# Patient Record
Sex: MALE | Race: WHITE | Employment: FULL TIME | URBAN - METROPOLITAN AREA
[De-identification: names, ages, dates, MRNs, and addresses within clinical notes are randomized per-mention and may not be internally consistent; named-entity substitution may affect disease eponyms.]

---

## 2024-10-07 ENCOUNTER — HOSPITAL ENCOUNTER (EMERGENCY)
Age: 44
Discharge: HOME | End: 2024-10-07
Attending: EMERGENCY MEDICINE
Payer: COMMERCIAL

## 2024-10-07 ENCOUNTER — APPOINTMENT (OUTPATIENT)
Dept: GENERAL RADIOLOGY | Age: 44
End: 2024-10-07
Payer: COMMERCIAL

## 2024-10-07 VITALS
DIASTOLIC BLOOD PRESSURE: 96 MMHG | SYSTOLIC BLOOD PRESSURE: 134 MMHG | BODY MASS INDEX: 32.73 KG/M2 | TEMPERATURE: 98 F | HEART RATE: 87 BPM | HEIGHT: 74 IN | OXYGEN SATURATION: 98 % | RESPIRATION RATE: 16 BRPM | WEIGHT: 255 LBS

## 2024-10-07 DIAGNOSIS — S62.324A CLOSED DISPLACED FRACTURE OF SHAFT OF FOURTH METACARPAL BONE OF RIGHT HAND, INITIAL ENCOUNTER: Primary | ICD-10-CM

## 2024-10-07 PROCEDURE — 29125 APPL SHORT ARM SPLINT STATIC: CPT

## 2024-10-07 PROCEDURE — 73130 X-RAY EXAM OF HAND: CPT

## 2024-10-07 PROCEDURE — 99283 EMERGENCY DEPT VISIT LOW MDM: CPT

## 2024-10-07 ASSESSMENT — ENCOUNTER SYMPTOMS
SHORTNESS OF BREATH: 0
ABDOMINAL PAIN: 0
VOMITING: 0
NAUSEA: 0
DIARRHEA: 0
SORE THROAT: 0

## 2024-10-07 ASSESSMENT — PAIN DESCRIPTION - ORIENTATION: ORIENTATION: RIGHT

## 2024-10-07 ASSESSMENT — PAIN - FUNCTIONAL ASSESSMENT: PAIN_FUNCTIONAL_ASSESSMENT: 0-10

## 2024-10-07 ASSESSMENT — PAIN DESCRIPTION - LOCATION: LOCATION: HAND

## 2024-10-07 ASSESSMENT — PAIN SCALES - GENERAL: PAINLEVEL_OUTOF10: 6

## 2024-10-07 ASSESSMENT — PAIN DESCRIPTION - DESCRIPTORS: DESCRIPTORS: ACHING;SHARP

## 2024-10-07 NOTE — ED PROVIDER NOTES
[facility] Emergency Department  [address]  [facility] [address]  Phone: [phone]  Fax: [phone]    EMERGENCY DEPARTMENT ENCOUNTER          Pt Name: Aleksey Dillon  MRN: [id number]  Birthdate 1980  Date of evaluation: 10/7/2024      CHIEF COMPLAINT       Chief Complaint   Patient presents with    Hand Injury     Patient chief complaint of right hand pain/swelling and discoloration. Patient states he was changing the smoke detector at 3 AM when he fell off the chair and caught himself with his hand.        HISTORY OF PRESENT ILLNESS       Aleksey Dillon is a 43 y.o. male who presents with pain to his right hand.  He was trying to replace the batteries of a smoke alarm that started beeping at 3 AM.  States he was standing on a chair when he slipped and fell and caught himself with his hand.  States he is unsure exactly the mechanism but sounds like more of a blunt force trauma.  He has some ecchymosis and edema to the hand and hurts primarily to the fourth digit at the MCP joint and PIP joint.  No head injury.  No neck or back pain.  Denies any other injuries or concerns    REVIEW OF SYSTEMS       Review of Systems   Constitutional:  Negative for chills and fever.   HENT:  Negative for sore throat.    Respiratory:  Negative for shortness of breath.    Cardiovascular:  Negative for chest pain.   Gastrointestinal:  Negative for abdominal pain, diarrhea, nausea and vomiting.   Musculoskeletal:  Negative for neck pain.   Skin:  Negative for rash.   Neurological:  Negative for weakness and numbness.        PAST MEDICAL HISTORY    has no past medical history on file.    SURGICAL HISTORY      has a past surgical history that includes shoulder surgery (Right) and [place] tooth extraction.    CURRENT MEDICATIONS       Current Discharge Medication List        CONTINUE these medications which have NOT CHANGED    Details   NONFORMULARY Farxiga-10 mg daily.      ibuprofen (ADVIL;MOTRIN) 800

## 2024-10-07 NOTE — DISCHARGE INSTRUCTIONS
PLEASE RETURN TO THE EMERGENCY DEPARTMENT IMMEDIATELY if your symptoms worsen in anyway or in 1-2 days if not improved for re-evaluation.  You should immediately return to the ER for symptoms such as new or worsening pain, fever, numbness or weakness to the arms or legs, coolness or color change of the arms or legs.  It is recommended that you keep your splint on until seen by your family doctor or an orthopedist.      Take your medication as indicated and prescribed.  If you are given an antibiotic then, make sure you get the prescription filled and take the antibiotics until finished.      Please understand that at this time there is no evidence for a more serious underlying process, but that early in the process of an illness or injury, an emergency department workup can be falsely reassuring.  You should contact your family doctor within the next 48 hours for a follow up appointment    THANK YOU!!!    From [facility] and [facility]    On behalf of the Emergency Department staff at [facility], I would like to thank you for giving us the opportunity to address your health care needs and concerns.    We hope that during your visit, our service was delivered in a professional and caring manner. Please keep [facility] in mind as we walk with you down the path to your own personal wellness.     Please expect an automated text message or email from us so we can ask a few questions about your health and progress. Based on your answers, a clinician may call you back to offer help and instructions.    Please understand that early in the process of an illness or injury, an emergency department workup can be falsely reassuring.  If you notice any worsening, changing or persistent symptoms please call your family doctor or return to the ER immediately.     Tell us how we did during your visit at [email or web address]   and let us know about your experience

## 2024-10-08 ENCOUNTER — OFFICE VISIT (OUTPATIENT)
Age: 44
End: 2024-10-08
Payer: COMMERCIAL

## 2024-10-08 VITALS — BODY MASS INDEX: 32.73 KG/M2 | WEIGHT: 255 LBS | HEIGHT: 74 IN

## 2024-10-08 DIAGNOSIS — S62.394A OTHER FRACTURE OF FOURTH METACARPAL BONE, RIGHT HAND, INITIAL ENCOUNTER FOR CLOSED FRACTURE: Primary | ICD-10-CM

## 2024-10-08 PROCEDURE — 26600 TREAT METACARPAL FRACTURE: CPT | Performed by: NURSE PRACTITIONER

## 2024-10-08 PROCEDURE — 99203 OFFICE O/P NEW LOW 30 MIN: CPT | Performed by: NURSE PRACTITIONER

## 2024-10-15 ENCOUNTER — OFFICE VISIT (OUTPATIENT)
Age: 44
End: 2024-10-15

## 2024-10-15 VITALS — HEIGHT: 74 IN | BODY MASS INDEX: 32.73 KG/M2 | WEIGHT: 255 LBS

## 2024-10-15 DIAGNOSIS — S62.394A OTHER FRACTURE OF FOURTH METACARPAL BONE, RIGHT HAND, INITIAL ENCOUNTER FOR CLOSED FRACTURE: Primary | ICD-10-CM

## 2024-10-15 PROCEDURE — 99024 POSTOP FOLLOW-UP VISIT: CPT | Performed by: NURSE PRACTITIONER

## 2024-10-15 RX ORDER — TRAMADOL HYDROCHLORIDE 50 MG/1
50 TABLET ORAL EVERY 6 HOURS PRN
Qty: 15 TABLET | Refills: 0 | Status: SHIPPED | OUTPATIENT
Start: 2024-10-15 | End: 2024-10-22

## 2024-10-15 NOTE — PROGRESS NOTES
manage pain Max Daily Amount: 200 mg, Disp: 15 tablet, Rfl: 0    NONFORMULARY, Farxiga-10 mg daily., Disp: , Rfl:     ibuprofen (ADVIL;MOTRIN) 800 MG tablet, Take 1 tablet by mouth every 8 hours as needed for Pain With food, Disp: 60 tablet, Rfl: 1    Multiple Vitamins-Minerals (THERAPEUTIC MULTIVITAMIN-MINERALS) tablet, Take 1 tablet by mouth daily, Disp: , Rfl:   No Known Allergies  Social History     Socioeconomic History    Marital status:      Spouse name: Not on file    Number of children: Not on file    Years of education: Not on file    Highest education level: Not on file   Occupational History    Not on file   Tobacco Use    Smoking status: Former    Smokeless tobacco: Current     Types: Chew   Substance and Sexual Activity    Alcohol use: Yes     Comment: social    Drug use: No    Sexual activity: Not on file   Other Topics Concern    Not on file   Social History Narrative    Not on file     Social Determinants of Health     Financial Resource Strain: Not on file   Food Insecurity: Not on file   Transportation Needs: Not on file   Physical Activity: Not on file   Stress: Not on file   Social Connections: Not on file   Intimate Partner Violence: Not on file   Housing Stability: Not on file     No past medical history on file.  Past Surgical History:   Procedure Laterality Date    SHOULDER SURGERY Right     age 16, labrum repair    WISDOM TOOTH EXTRACTION       Family History   Problem Relation Age of Onset    High Cholesterol Father     Heart Attack Father 58    High Cholesterol Paternal Grandfather     Heart Disease Paternal Grandfather     Diabetes Maternal Grandmother     Cancer Maternal Grandmother         breast cancer    Diabetes Maternal Grandfather     Cancer Maternal Grandfather         pancreatic cancer     High Cholesterol Paternal Grandmother           Electronically signed by MARY Restrepo CNP on 10/15/2024 at 1:16 PM     Please note that this chart was generated using voice

## 2024-10-22 ENCOUNTER — OFFICE VISIT (OUTPATIENT)
Age: 44
End: 2024-10-22

## 2024-10-22 VITALS — HEIGHT: 74 IN | BODY MASS INDEX: 32.73 KG/M2 | WEIGHT: 255 LBS

## 2024-10-22 DIAGNOSIS — S62.394A OTHER FRACTURE OF FOURTH METACARPAL BONE, RIGHT HAND, INITIAL ENCOUNTER FOR CLOSED FRACTURE: Primary | ICD-10-CM

## 2024-10-22 PROCEDURE — 99024 POSTOP FOLLOW-UP VISIT: CPT | Performed by: NURSE PRACTITIONER

## 2024-10-22 NOTE — PROGRESS NOTES
[facility]      [facility] PHYSICIANS [facility]  [facility] ORTHOPAEDICS AND SPORTS MEDICINE  [address]  [place] [address]  Dept: [phone]  Dept Fax: [phone]    Chief Compliant:  Chief Complaint   Patient presents with    4th metacarpal fracture        History of Present Illness:  This is a pleasant 44 y.o. male who is here for follow up regarding his right hand fracture. He is now 2 weeks from date of injury. States his pain and swelling are improving. He notes more pain at the PIP joint of the ring finger, states it feels jammed. No new injuries. No numbness or tingling. He has been compliant with wearing the brace.     Physical Exam: Right hand: Skin intact. Improved swelling and bruising. TTP over the fracture site and along the radial and ulnar collateral ligaments of the PIP joint. No instability of the PIP joint with varus and valgus stress. Able to flex the PIP joint to 90 degrees with some pain. Unable to make a complete fist.     Imaging: 3 views of the right hand re-demonstrates an oblique fracture of the right fourth metacarpal with no significant change in alignment or further displacement compared to previous imaging. No significant healing at this time.       Assessment and Plan:    This is a pleasant 44 y.o. male who is status post above. Reviewed imaging. Continue non-op treatment with immobilization in the ulnar gutter brace. No lifting/pulling/pushing with the right hand. Plan to follow up in 4 weeks.          Past History:    Current Outpatient Medications:     traMADol (ULTRAM) 50 MG tablet, Take 1 tablet by mouth every 6 hours as needed for Pain for up to 7 days. Intended supply: 7 days. Take lowest dose possible to manage pain Max Daily Amount: 200 mg, Disp: 15 tablet, Rfl: 0    NONFORMULARY, Farxiga-10 mg daily., Disp: , Rfl:     ibuprofen (ADVIL;MOTRIN) 800 MG tablet, Take 1 tablet by mouth every 8 hours as

## 2024-11-19 ENCOUNTER — OFFICE VISIT (OUTPATIENT)
Age: 44
End: 2024-11-19

## 2024-11-19 VITALS — WEIGHT: 255 LBS | BODY MASS INDEX: 32.73 KG/M2 | HEIGHT: 74 IN

## 2024-11-19 DIAGNOSIS — S62.394D OTHER FRACTURE OF FOURTH METACARPAL BONE, RIGHT HAND, SUBSEQUENT ENCOUNTER FOR FRACTURE WITH ROUTINE HEALING: Primary | ICD-10-CM

## 2024-11-19 PROCEDURE — 99024 POSTOP FOLLOW-UP VISIT: CPT | Performed by: NURSE PRACTITIONER

## 2024-12-11 ENCOUNTER — HOSPITAL ENCOUNTER (OUTPATIENT)
Dept: OCCUPATIONAL THERAPY | Facility: CLINIC | Age: 44
Setting detail: THERAPIES SERIES
Discharge: HOME | End: 2024-12-11
Payer: COMMERCIAL

## 2024-12-11 PROCEDURE — 97165 OT EVAL LOW COMPLEX 30 MIN: CPT

## 2024-12-11 NOTE — CONSULTS
[] [facility]  Outpatient Rehabilitation &  Therapy  [address]  [phone]  F: [phone] [] [facility]  Outpatient Rehabilitation &  Therapy  [address]   [address]  P: [phone]  F: [phone] [x] [facility]  Outpatient Rehabilitation &  Therapy  [address]  P: [phone]  F: [phone] [] [facility]  Outpatient Rehabilitation &  Therapy  [address]   P: [phone]  F: [phone] [] [facility]   Outpatient Rehabilitation   & Therapy  [address]  P: [phone]   F: [phone]     Occupational Therapy Hand & Upper Extremity  Initial Evaluation    Date: 2024      Patient: Aleksey Dillon  : 1980  MRN: [id number]  Referring Provider:   Wanda Guerra APRN - CNP  Insurance: [employer] - based on med nec   Medical Diagnosis: S62.394D (ICD-10-CM) - Other fracture of fourth metacarpal bone, right hand, subsequent encounter for fracture with routine healing    Rehab Codes: stiffness in hand M25.64,, effusion of hand M25.44,, or pain in right finger(s) M79.644,  Onset Date: 10/6/24    Walter Lancaster Appt: 24    Subjective:   CC: pn and decreased ROM in the dominant R digits  HPI: Pt states he slipped and fell onto his R hand late at night on 10/6. Pt states he thought he broke his hand but did not go to the ED until the next day. Pt states that he is having is difficulty w/ bending his R 4th digit and will get soreness in the L 4th digit w/ activity.     Mechanism of Injury: fall off chair  Surgery Date: N/A  Precautions: None    Involved Extremity: Right   Dominant Extremity: Right    Past Medical History: Unremarkable          Comorbidities: NA    Medications: None Allergies:  None    Pain: Intensity:   8/10 Location: R hand      Pain Type: Intermittant and with movement/activity  Pain Altered Tx: no  Action Taken:none            Home Environment:    Pt lives in a  and

## 2024-12-17 ENCOUNTER — OFFICE VISIT (OUTPATIENT)
Age: 44
End: 2024-12-17

## 2024-12-17 VITALS — HEIGHT: 74 IN | BODY MASS INDEX: 32.73 KG/M2 | WEIGHT: 255 LBS

## 2024-12-17 DIAGNOSIS — S62.394D OTHER FRACTURE OF FOURTH METACARPAL BONE, RIGHT HAND, SUBSEQUENT ENCOUNTER FOR FRACTURE WITH ROUTINE HEALING: Primary | ICD-10-CM

## 2024-12-17 PROCEDURE — 99024 POSTOP FOLLOW-UP VISIT: CPT | Performed by: NURSE PRACTITIONER

## 2024-12-17 NOTE — PROGRESS NOTES
[facility] Orthopedics & Sports Medicine      [facility] PHYSICIANS [facility]OPAED[facility] AND [facility]ORTS MEDICINE  [address]  [place] [address]  Dept: [phone]  Dept Fax: [phone]    Chief Compliant:  Chief Complaint   Patient presents with    right hand fracture        History of Present Illness:  12/17/24:This is a pleasant 44 y.o. male who is here for follow-up of his right hand fourth metacarpal fracture that occurred approximately 10 weeks ago.  He states pain at the fracture site has improved but he does note some discomfort if he hits the hand on something or with a firm handshake.  He continues to have stiffness and limited function of the ring finger specifically at the PIP joint.  He notes he is catching it on things such as his pants pocket or sweatshirt pocket which then causes pain.  Denies any new injuries or falls.  He is not taking anything for pain.  He has only had 1 hand therapy session and has another one scheduled for tomorrow.    Physical Exam: Right hand: Skin intact.  In general the right hand is bigger than the left.  No TTP over the fracture site.  TTP stiffness at the PIP joint with limited active flexion but passively I am able to flex the PIP to about 50 degrees.  No instability with varus and valgus stress of the PIP joint. Unable to make a complete fist. Near full extension of the 4th digit.     Imaging: 3 views of the right hand re-demonstrate a fracture of the fourth metacarpal with interval bony healing.  No change in fracture alignment or further displacement compared to previous imaging.      Assessment and Plan:    This is a pleasant 44 y.o. male who is status post above.  Overall he continues to improve but is still having some stiffness and the ring finger PIP joint.  Recommend continuing with therapy as well as home exercises.  Expect this to improve with time.  Patient may follow-up if symptoms fail to improve.

## 2024-12-18 ENCOUNTER — HOSPITAL ENCOUNTER (OUTPATIENT)
Dept: OCCUPATIONAL THERAPY | Facility: CLINIC | Age: 44
Setting detail: THERAPIES SERIES
Discharge: HOME | End: 2024-12-18
Payer: COMMERCIAL

## 2024-12-18 PROCEDURE — 97110 THERAPEUTIC EXERCISES: CPT

## 2024-12-18 PROCEDURE — 97140 MANUAL THERAPY 1/> REGIONS: CPT

## 2024-12-18 NOTE — FLOWSHEET NOTE
[] [facility]  Outpatient Rehabilitation &  Therapy  [address]  [phone]  F: [phone] [] [facility]  Outpatient Rehabilitation &  Therapy  [address]   [address]  P: [phone]  F: [phone] [x] [facility]  Outpatient Rehabilitation &  Therapy  [address]  P: [phone]  F: [phone] [] [facility]  Outpatient Rehabilitation &  Therapy  [address]   P: [phone]  F: [phone] [] [facility]   Outpatient Rehabilitation   & Therapy  [address]  P: [phone]   F: [phone]     Occupational Therapy Daily Treatment Note    Date:  2024  Patient Name:  Aleksey Dillon    :  1980  MRN: [id number]  Referring Provider:   Wanda Guerra APRN - CNP  Insurance: [employer] - based on med nec   Medical Diagnosis: S62.394D (ICD-10-CM) - Other fracture of fourth metacarpal bone, right hand, subsequent encounter for fracture with routine healing               Rehab Codes: stiffness in hand M25.64,, effusion of hand M25.44,, or pain in right finger(s) M79.644,  Onset Date: 10/6/24                 Next  Appt: PRN    Visit# / total visits: ; Progress note for Medicare patient due at visit 12    Cancels/No Shows: 0/0      Subjective:    Pain:  [] Yes  [x] No Location:  N/A Pain Rating: (0-10 scale) 0/10  Pain altered Tx:  [x] No  [] Yes  Action:  Pt Comments:  I use a stress ball at home, and limited use of the putty.  I just don't want to jam the finger as it sticks out.    Precautions [x] No  [] Yes: :  Surgery procedure and date:  s/p R 4th metacarpal fx on 10/6/24.     Objective:  Today's Treatment:  Modalities:  Exercises:  Exercise Flow Sheet:  Exercise Reps/Time Weight/Level Comments Completed   Composite fist 3x10 AROM HEP x   Claw fist 3x10 AROM HEP x   Digital blocking 3x10 ea AROM HEP  DIP/PIP   X   Putty gripping 3 mins Yellow  tan HEP   flatten -  X

## 2024-12-20 ENCOUNTER — HOSPITAL ENCOUNTER (OUTPATIENT)
Dept: OCCUPATIONAL THERAPY | Facility: CLINIC | Age: 44
Setting detail: THERAPIES SERIES
Discharge: HOME | End: 2024-12-20
Payer: COMMERCIAL

## 2024-12-20 PROCEDURE — 97110 THERAPEUTIC EXERCISES: CPT

## 2024-12-20 PROCEDURE — 97140 MANUAL THERAPY 1/> REGIONS: CPT

## 2024-12-20 NOTE — FLOWSHEET NOTE
goals.  [x] Specific Instructions for subsequent treatments: AROM, PROM, Strengthening    [] Other:       Time In: 0800  Time Out: 0836      Electronically signed by:  VIELKA Alexis/ALFONSO

## 2025-01-02 ENCOUNTER — HOSPITAL ENCOUNTER (OUTPATIENT)
Dept: OCCUPATIONAL THERAPY | Facility: CLINIC | Age: 45
Setting detail: THERAPIES SERIES
Discharge: HOME | End: 2025-01-02
Payer: COMMERCIAL

## 2025-01-02 PROCEDURE — 97110 THERAPEUTIC EXERCISES: CPT

## 2025-01-02 PROCEDURE — 97140 MANUAL THERAPY 1/> REGIONS: CPT

## 2025-01-02 NOTE — FLOWSHEET NOTE
[] [facility]  Outpatient Rehabilitation &  Therapy  [address]  [phone]  F: [phone] [] [facility]  Outpatient Rehabilitation &  Therapy  [address]   [address]  P: [phone]  F: [phone] [x] [facility]  Outpatient Rehabilitation &  Therapy  [address]  P: [phone]  F: [phone] [] [facility]  Outpatient Rehabilitation &  Therapy  [address]   P: [phone]  F: [phone] [] [facility]   Outpatient Rehabilitation   & Therapy  [address]  P: [phone]   F: [phone]     Occupational Therapy Daily Treatment Note    Date:  2025  Patient Name:  Aleksey Dillon    :  1980  MRN: [id number]  Referring Provider:   Wanda Guerra APRN - CNP  Insurance: [employer] - based on med nec   Medical Diagnosis: S62.394D (ICD-10-CM) - Other fracture of fourth metacarpal bone, right hand, subsequent encounter for fracture with routine healing               Rehab Codes: stiffness in hand M25.64,, effusion of hand M25.44,, or pain in right finger(s) M79.644,  Onset Date: 10/6/24                 Next  Appt: PRN  Visit# / total visits: ; Progress note for Medicare patient due at visit 12    Cancels/No Shows: 0/0      Subjective:    Pain:  [] Yes  [x] No Location:  N/A Pain Rating: (0-10 scale) 0/10  Pain altered Tx:  [x] No  [] Yes  Action:  Pt Comments:  Pt reports he is doing well. I have been trying to use it, it hurts only if I bump it or someone shakes my hand too hard.  I am back to work on Monday.    Precautions [x] No  [] Yes::  Surgery procedure and date:  s/p R 4th metacarpal fx on 10/6/24.     Objective:  Today's Treatment:  Modalities:  Exercises:  Exercise Flow Sheet:  Exercise Reps/Time Weight/Level Comments Completed   Composite fist 3x10 AROM  X   Claw fist 3x10 AROM  X   Digital blocking 3x10 ea AROM DIP/PIP   X   Towel scrunch 15   -   Intrinsic

## 2025-01-08 ENCOUNTER — APPOINTMENT (OUTPATIENT)
Dept: OCCUPATIONAL THERAPY | Facility: CLINIC | Age: 45
End: 2025-01-08
Payer: COMMERCIAL

## 2025-01-10 ENCOUNTER — APPOINTMENT (OUTPATIENT)
Dept: OCCUPATIONAL THERAPY | Facility: CLINIC | Age: 45
End: 2025-01-10
Payer: COMMERCIAL